# Patient Record
(demographics unavailable — no encounter records)

---

## 2024-10-08 NOTE — ADDENDUM
[FreeTextEntry1] : Entered by Paco Eric, acting as scribe for Dr. Hugo White. The documentation recorded by the scribe accurately reflects the service I personally performed and the decisions made by me.

## 2024-10-08 NOTE — LETTER BODY
[FreeTextEntry1] :   Mo Lainez -[address], [place] [address] [phone]   Dear Dr. Lainez,   Reason for visit: Left kidney stones s/p left ureteroscopy. Overactive bladder. Previous UTI. Urinary frequency.   This is a 59 year old Cantonese-speaking woman with hypothyroidism and previous microscopic hematuria, presenting with overactive bladder, urinary frequency, recurrent UTI and left kidney stones s/p left ureteroscopy in Aug 2020, though she had many residual fragments. The patient previously went to the emergency department due to right flank pain. Her CT of abdomen and pelvis demonstrated a 3.5 mm nonobstructing calculus at the right UVJ, which has since resolved. Her renal ultrasound in October 2021 demonstrated mild hydronephrosis in the left kidney along with bilateral nonobstructing stones. There was no evidence of obstruction. Her ultrasound in August 2023 demonstrated an enlarge left renal stone. The patient returns today for follow-up. In terms of frequency, she reports taking Oxybutynin 5 mg regularly. She has no side effects or difficulties. She reports stable urinary symptoms with Oxybutynin 5 mg. Patient also continues to take Potassium Citrate. In terms of her previous UTI, patient has stopped taking Methenamine and vitamin C. Patient reports stable symptoms without medication. She denies any gross hematuria, dysuria or urinary incontinence. The patient is doing well. She denies any changes in health. All other review of systems are negative. Past medical history, family history, surgical history, and social history were unchanged. Medications and allergies were reviewed. She has no known allergies to medication.    On examination, the patient is a healthy-appearing woman in no acute distress. She is alert and oriented follows commands. She has normal mood and affect. She is normocephalic. Neck is supple. Oral no thrush. Respirations are unlabored. Abdomen is soft and nontender. Bladder is nonpalpable. No CVA tenderness. Neurologically she is grossly intact. No peripheral edema. Skin without gross abnormality.    Assessment: Persistent left renal stones, s/p left ureteroscopy. Overactive bladder. Previous UTI. Urinary frequency.    I counseled the patient. In terms of her persistent left renal stones, her previous renal ultrasound demonstrated an enlarged stone in the left kidney lower pole. I recommended the patient repeat renal ultrasound to evaluate stone burden. I counseled the patient regarding the procedure. The patient will take the necessary preparations for the procedure.  I also recommended the patient continue taking potassium citrate as directed. In terms of her urinary frequency, she has stable symptoms on medical therapy. I recommended the patient continue taking Oxybutynin 5 mg regularly as directed. I renewed the patient's prescription for Oxybutynin today. I encouraged the patient to continue medications regularly as directed. In terms of her recurrent UTI, patient discontinued Prophylactic Methenamine Hippurate and Vitamin C. She is asymptomatic. I recommended the patient obtain urinalysis and urine culture to evaluate for infections. Risks and alternatives were discussed. I answered the patient questions. The patient will follow-up as directed and will contact me with any questions or concerns. Thank you for the opportunity to participate in the care of Ms. CARMICHAEL. I will keep you updated on her progress.    Plan: Continue Oxybutynin 5 mg and Potassium citrate. Urinalysis. Urine culture. Renal ultrasound. Follow up in 1 year.    I personally reviewed ultrasound images with the patient today and images demonstrated right 2 echogenic foci with shadow and twinkle, 7 mm and 4.5 mm in lower pole. Scarred left kidney visualized with 2 echogenic foci visualized, 1.3 cm in lower pole and 1.4 cm in mid pole. Both kidneys appear normal in size and echogenicity, no solid masses or hydronephrosis visualized.

## 2024-10-08 NOTE — LETTER BODY
[FreeTextEntry1] :   Mo Lainez -[address] [address] [phone]   Dear Dr. Lainez,   Reason for visit: Left kidney stones s/p left ureteroscopy. Overactive bladder. Previous UTI. Urinary frequency.   This is a 59 year old Cantonese-speaking woman with hypothyroidism and previous microscopic hematuria, presenting with overactive bladder, urinary frequency, recurrent UTI and left kidney stones s/p left ureteroscopy in Aug 2020, though she had many residual fragments. The patient previously went to the emergency department due to right flank pain. Her CT of abdomen and pelvis demonstrated a 3.5 mm nonobstructing calculus at the right UVJ, which has since resolved. Her renal ultrasound in October 2021 demonstrated mild hydronephrosis in the left kidney along with bilateral nonobstructing stones. There was no evidence of obstruction. Her ultrasound in August 2023 demonstrated an enlarge left renal stone. The patient returns today for follow-up. In terms of frequency, she reports taking Oxybutynin 5 mg regularly. She has no side effects or difficulties. She reports stable urinary symptoms with Oxybutynin 5 mg. Patient also continues to take Potassium Citrate. In terms of her previous UTI, patient has stopped taking Methenamine and vitamin C. Patient reports stable symptoms without medication. She denies any gross hematuria, dysuria or urinary incontinence. The patient is doing well. She denies any changes in health. All other review of systems are negative. Past medical history, family history, surgical history, and social history were unchanged. Medications and allergies were reviewed. She has no known allergies to medication.    On examination, the patient is a healthy-appearing woman in no acute distress. She is alert and oriented follows commands. She has normal mood and affect. She is normocephalic. Neck is supple. Oral no thrush. Respirations are unlabored. Abdomen is soft and nontender. Bladder is nonpalpable. No CVA tenderness. Neurologically she is grossly intact. No peripheral edema. Skin without gross abnormality.    Assessment: Persistent left renal stones, s/p left ureteroscopy. Overactive bladder. Previous UTI. Urinary frequency.    I counseled the patient. In terms of her persistent left renal stones, her previous renal ultrasound demonstrated an enlarged stone in the left kidney lower pole. I recommended the patient repeat renal ultrasound to evaluate stone burden. I counseled the patient regarding the procedure. The patient will take the necessary preparations for the procedure.  I also recommended the patient continue taking potassium citrate as directed. In terms of her urinary frequency, she has stable symptoms on medical therapy. I recommended the patient continue taking Oxybutynin 5 mg regularly as directed. I renewed the patient's prescription for Oxybutynin today. I encouraged the patient to continue medications regularly as directed. In terms of her recurrent UTI, patient discontinued Prophylactic Methenamine Hippurate and Vitamin C. She is asymptomatic. I recommended the patient obtain urinalysis and urine culture to evaluate for infections. Risks and alternatives were discussed. I answered the patient questions. The patient will follow-up as directed and will contact me with any questions or concerns. Thank you for the opportunity to participate in the care of Ms. CARMICHAEL. I will keep you updated on her progress.    Plan: Continue Oxybutynin 5 mg and Potassium citrate. Urinalysis. Urine culture. Renal ultrasound. Follow up in 1 year.    I personally reviewed ultrasound images with the patient today and images demonstrated right 2 echogenic foci with shadow and twinkle, 7 mm and 4.5 mm in lower pole. Scarred left kidney visualized with 2 echogenic foci visualized, 1.3 cm in lower pole and 1.4 cm in mid pole. Both kidneys appear normal in size and echogenicity, no solid masses or hydronephrosis visualized.

## 2025-07-08 NOTE — HISTORY OF PRESENT ILLNESS
[FreeTextEntry1] : Patient went to ED for high fever, chills, dysuria, CT shows obstructive nephrolithiasis in the distal right ureter with moderate right hydroureteronephrosis; obstructing calculus in the left renal pelvis with mild left caliectasis. UC on 6/25/2025 positive for E. Coli, repeat UC were negative. Pateitn discharged with PO ABx for 2 weeks. Pateint reports gross hematuria since yesterday, discomfort to left flank.   Please refer to URO Consult note

## 2025-07-08 NOTE — ADDENDUM
[FreeTextEntry1] : Entered by Camille Mckeon, acting as scribe for Dr Hugo White. The documentation recorded by the scribe accurately reflects the service I personally performed and the decisions made by me.

## 2025-07-08 NOTE — LETTER BODY
[FreeTextEntry1] :  Mo Lainez -[address], [place] [address] [phone]   Dear Dr. Lainez,   Reason for visit: Left kidney stones s/p left ureteroscopy. Overactive bladder. Previous UTI. Urinary frequency.  Right ureteral stone and enlarging left kidney stone   This is a 60 year old Cantonese-speaking woman with hypothyroidism and previous microscopic hematuria, presenting with overactive bladder, urinary frequency, recurrent UTI and left kidney stones s/p left ureteroscopy in Aug 2020, though she had many residual fragments. The patient previously went to the emergency department due to right flank pain. Her CT of abdomen and pelvis demonstrated a 3.5 mm nonobstructing calculus at the right UVJ, which has since resolved. Her renal ultrasound in October 2021 demonstrated mild hydronephrosis in the left kidney along with bilateral nonobstructing stones. There was no evidence of obstruction. Her ultrasound in August 2023 demonstrated an enlarge left renal stone. The patient returns today for follow-up. Since she was last seen, the patient presented to the ED 2 weeks ago for high fever, chills and dysuria. CT at that time demonstrated obstructive nephrolithiasis in the distal right ureter with moderate right hydroureteronephrosis as well as an obstructing calculs in the left renal pelvis with mild left caliectasis. Her urine culture on 06/25/2025 was positive for E. Coli and the subsequent repeat urine culture was negative. The patient was discharged with 2 weeks of oral antibiotics. She reports gross hematuria since yesterday as well as left flank discomfort. All other review of systems are negative. Past medical history, family history, surgical history, and social history were unchanged. Medications and allergies were reviewed. She has no known allergies to medication.    On examination, the patient is a healthy-appearing woman in no acute distress. She is alert and oriented follows commands. She has normal mood and affect. She is normocephalic. Neck is supple. Oral no thrush. Respirations are unlabored. Abdomen is soft and nontender. Bladder is nonpalpable. No CVA tenderness. Neurologically she is grossly intact. No peripheral edema. Skin without gross abnormality.  I personally reviewed the CT with the patient today. Images demonstrated obstructive nephrolithiasis in the distal right ureter with moderate right hydroureteronephrosis and an obstructing calculus in the left renal pelvis with mild left caliectasis.  Assessment: Persistent left renal stones, s/p left ureteroscopy. Overactive bladder. Previous UTI. Urinary frequency. New obstructing kidney stones.     I counseled the patient. In terms of her new renal stones, the patient's CT scan in the ED demonstrated obstructive nephrolithiasis in the distal right ureter with moderate right hydroureteronephrosis and an obstructing calculus in the left renal pelvis with mild left caliectasis. I recommended that the patient undergo ureteroscopy, laser lithotripsy, stone extraction, and stent placement. I counseled the patient regarding the procedure. The risks and benefits were discussed. Alternatives were given. I answered the patient's questions. The patient will take the necessary preparations for the procedure. The patient will obtain pre-operative labs including activated partial thromboplastin time, BMP, CBC w/ DIFF, culture, prothrombin Time and INR. The patient will follow-up as directed and will contact me with any questions or concerns. Thank you for the opportunity to participate in the care of Ms. CARMICHAEL. I will keep you updated on her progress.    Plan: Ureteroscopy, laser lithotripsy, with CVAC stone extraction, and stent placement. Preoperative labs. Follow up as directed.